# Patient Record
Sex: MALE | URBAN - METROPOLITAN AREA
[De-identification: names, ages, dates, MRNs, and addresses within clinical notes are randomized per-mention and may not be internally consistent; named-entity substitution may affect disease eponyms.]

---

## 2020-06-26 ENCOUNTER — OFFICE VISIT (OUTPATIENT)
Dept: URBAN - METROPOLITAN AREA CLINIC 45 | Facility: CLINIC | Age: 60
End: 2020-06-26
Payer: COMMERCIAL

## 2020-06-26 DIAGNOSIS — H52.223 REGULAR ASTIGMATISM, BILATERAL: ICD-10-CM

## 2020-06-26 DIAGNOSIS — H02.825 CYSTS OF LEFT LOWER EYELID: Primary | ICD-10-CM

## 2020-06-26 PROCEDURE — 92004 COMPRE OPH EXAM NEW PT 1/>: CPT | Performed by: OPTOMETRIST

## 2020-06-26 ASSESSMENT — VISUAL ACUITY
OS: 20/25
OD: 20/20

## 2020-06-26 ASSESSMENT — INTRAOCULAR PRESSURE
OD: 14
OS: 15

## 2020-06-26 NOTE — IMPRESSION/PLAN
Impression: Cysts of left lower eyelid: H02.825. OS. Plan: Pt ed re: condition. Use hot compresses BID OS. RTC 1 year x CEE.